# Patient Record
Sex: FEMALE | Race: OTHER | HISPANIC OR LATINO
[De-identification: names, ages, dates, MRNs, and addresses within clinical notes are randomized per-mention and may not be internally consistent; named-entity substitution may affect disease eponyms.]

---

## 2017-11-15 ENCOUNTER — TRANSCRIPTION ENCOUNTER (OUTPATIENT)
Age: 32
End: 2017-11-15

## 2020-08-05 ENCOUNTER — APPOINTMENT (OUTPATIENT)
Dept: ANTEPARTUM | Facility: CLINIC | Age: 35
End: 2020-08-05

## 2020-08-05 ENCOUNTER — ASOB RESULT (OUTPATIENT)
Age: 35
End: 2020-08-05

## 2020-08-05 ENCOUNTER — INPATIENT (INPATIENT)
Facility: HOSPITAL | Age: 35
LOS: 1 days | Discharge: HOME | End: 2020-08-07
Attending: OBSTETRICS & GYNECOLOGY | Admitting: OBSTETRICS & GYNECOLOGY
Payer: COMMERCIAL

## 2020-08-05 VITALS — WEIGHT: 158.73 LBS | HEIGHT: 62 IN

## 2020-08-05 DIAGNOSIS — Z3A.00 WEEKS OF GESTATION OF PREGNANCY NOT SPECIFIED: ICD-10-CM

## 2020-08-05 DIAGNOSIS — Z34.80 ENCOUNTER FOR SUPERVISION OF OTHER NORMAL PREGNANCY, UNSPECIFIED TRIMESTER: ICD-10-CM

## 2020-08-05 DIAGNOSIS — O26.899 OTHER SPECIFIED PREGNANCY RELATED CONDITIONS, UNSPECIFIED TRIMESTER: ICD-10-CM

## 2020-08-05 LAB
BASOPHILS # BLD AUTO: 0 K/UL — SIGNIFICANT CHANGE UP (ref 0–0.2)
BASOPHILS NFR BLD AUTO: 0 % — SIGNIFICANT CHANGE UP (ref 0–2)
BLD GP AB SCN SERPL QL: NEGATIVE — SIGNIFICANT CHANGE UP
EOSINOPHIL # BLD AUTO: 0 K/UL — SIGNIFICANT CHANGE UP (ref 0–0.5)
EOSINOPHIL NFR BLD AUTO: 0 % — SIGNIFICANT CHANGE UP (ref 0–6)
HCT VFR BLD CALC: 39.3 % — SIGNIFICANT CHANGE UP (ref 34.5–45)
HGB BLD-MCNC: 12.5 G/DL — SIGNIFICANT CHANGE UP (ref 11.5–15.5)
HYPOCHROMIA BLD QL: SLIGHT — SIGNIFICANT CHANGE UP
LYMPHOCYTES # BLD AUTO: 1.01 K/UL — SIGNIFICANT CHANGE UP (ref 1–3.3)
LYMPHOCYTES # BLD AUTO: 4.3 % — LOW (ref 13–44)
MANUAL SMEAR VERIFICATION: SIGNIFICANT CHANGE UP
MCHC RBC-ENTMCNC: 28.2 PG — SIGNIFICANT CHANGE UP (ref 27–34)
MCHC RBC-ENTMCNC: 31.8 GM/DL — LOW (ref 32–36)
MCV RBC AUTO: 88.7 FL — SIGNIFICANT CHANGE UP (ref 80–100)
MONOCYTES # BLD AUTO: 0.21 K/UL — SIGNIFICANT CHANGE UP (ref 0–0.9)
MONOCYTES NFR BLD AUTO: 0.9 % — LOW (ref 2–14)
NEUTROPHILS # BLD AUTO: 22.33 K/UL — HIGH (ref 1.8–7.4)
NEUTROPHILS NFR BLD AUTO: 94.8 % — HIGH (ref 43–77)
PLAT MORPH BLD: NORMAL — SIGNIFICANT CHANGE UP
PLATELET # BLD AUTO: 296 K/UL — SIGNIFICANT CHANGE UP (ref 150–400)
RBC # BLD: 4.43 M/UL — SIGNIFICANT CHANGE UP (ref 3.8–5.2)
RBC # FLD: 13.5 % — SIGNIFICANT CHANGE UP (ref 10.3–14.5)
RBC BLD AUTO: NORMAL — SIGNIFICANT CHANGE UP
RH IG SCN BLD-IMP: POSITIVE — SIGNIFICANT CHANGE UP
RH IG SCN BLD-IMP: POSITIVE — SIGNIFICANT CHANGE UP
SARS-COV-2 RNA SPEC QL NAA+PROBE: SIGNIFICANT CHANGE UP
T PALLIDUM AB TITR SER: NEGATIVE — SIGNIFICANT CHANGE UP
WBC # BLD: 23.56 K/UL — HIGH (ref 3.8–10.5)
WBC # FLD AUTO: 23.56 K/UL — HIGH (ref 3.8–10.5)

## 2020-08-05 PROCEDURE — 88307 TISSUE EXAM BY PATHOLOGIST: CPT | Mod: 26

## 2020-08-05 PROCEDURE — 76818 FETAL BIOPHYS PROFILE W/NST: CPT | Mod: 26

## 2020-08-05 RX ORDER — DIPHENHYDRAMINE HCL 50 MG
25 CAPSULE ORAL EVERY 6 HOURS
Refills: 0 | Status: DISCONTINUED | OUTPATIENT
Start: 2020-08-05 | End: 2020-08-07

## 2020-08-05 RX ORDER — SODIUM CHLORIDE 9 MG/ML
1000 INJECTION, SOLUTION INTRAVENOUS
Refills: 0 | Status: DISCONTINUED | OUTPATIENT
Start: 2020-08-05 | End: 2020-08-05

## 2020-08-05 RX ORDER — KETOROLAC TROMETHAMINE 30 MG/ML
30 SYRINGE (ML) INJECTION ONCE
Refills: 0 | Status: DISCONTINUED | OUTPATIENT
Start: 2020-08-05 | End: 2020-08-05

## 2020-08-05 RX ORDER — DIBUCAINE 1 %
1 OINTMENT (GRAM) RECTAL EVERY 6 HOURS
Refills: 0 | Status: DISCONTINUED | OUTPATIENT
Start: 2020-08-05 | End: 2020-08-07

## 2020-08-05 RX ORDER — AMPICILLIN TRIHYDRATE 250 MG
2 CAPSULE ORAL ONCE
Refills: 0 | Status: COMPLETED | OUTPATIENT
Start: 2020-08-05 | End: 2020-08-05

## 2020-08-05 RX ORDER — AMPICILLIN TRIHYDRATE 250 MG
1 CAPSULE ORAL EVERY 4 HOURS
Refills: 0 | Status: DISCONTINUED | OUTPATIENT
Start: 2020-08-05 | End: 2020-08-05

## 2020-08-05 RX ORDER — SODIUM CHLORIDE 9 MG/ML
3 INJECTION INTRAMUSCULAR; INTRAVENOUS; SUBCUTANEOUS EVERY 8 HOURS
Refills: 0 | Status: DISCONTINUED | OUTPATIENT
Start: 2020-08-05 | End: 2020-08-07

## 2020-08-05 RX ORDER — OXYTOCIN 10 UNIT/ML
333.33 VIAL (ML) INJECTION
Qty: 20 | Refills: 0 | Status: DISCONTINUED | OUTPATIENT
Start: 2020-08-05 | End: 2020-08-07

## 2020-08-05 RX ORDER — IBUPROFEN 200 MG
600 TABLET ORAL EVERY 6 HOURS
Refills: 0 | Status: COMPLETED | OUTPATIENT
Start: 2020-08-05 | End: 2021-07-04

## 2020-08-05 RX ORDER — OXYTOCIN 10 UNIT/ML
2 VIAL (ML) INJECTION
Qty: 30 | Refills: 0 | Status: DISCONTINUED | OUTPATIENT
Start: 2020-08-05 | End: 2020-08-05

## 2020-08-05 RX ORDER — OXYCODONE HYDROCHLORIDE 5 MG/1
5 TABLET ORAL ONCE
Refills: 0 | Status: DISCONTINUED | OUTPATIENT
Start: 2020-08-05 | End: 2020-08-07

## 2020-08-05 RX ORDER — SIMETHICONE 80 MG/1
80 TABLET, CHEWABLE ORAL EVERY 4 HOURS
Refills: 0 | Status: DISCONTINUED | OUTPATIENT
Start: 2020-08-05 | End: 2020-08-07

## 2020-08-05 RX ORDER — AER TRAVELER 0.5 G/1
1 SOLUTION RECTAL; TOPICAL EVERY 4 HOURS
Refills: 0 | Status: DISCONTINUED | OUTPATIENT
Start: 2020-08-05 | End: 2020-08-07

## 2020-08-05 RX ORDER — HYDROCORTISONE 1 %
1 OINTMENT (GRAM) TOPICAL EVERY 6 HOURS
Refills: 0 | Status: DISCONTINUED | OUTPATIENT
Start: 2020-08-05 | End: 2020-08-07

## 2020-08-05 RX ORDER — BENZOCAINE 10 %
1 GEL (GRAM) MUCOUS MEMBRANE EVERY 6 HOURS
Refills: 0 | Status: DISCONTINUED | OUTPATIENT
Start: 2020-08-05 | End: 2020-08-07

## 2020-08-05 RX ORDER — CITRIC ACID/SODIUM CITRATE 300-500 MG
15 SOLUTION, ORAL ORAL EVERY 6 HOURS
Refills: 0 | Status: DISCONTINUED | OUTPATIENT
Start: 2020-08-05 | End: 2020-08-05

## 2020-08-05 RX ORDER — OXYCODONE HYDROCHLORIDE 5 MG/1
5 TABLET ORAL
Refills: 0 | Status: DISCONTINUED | OUTPATIENT
Start: 2020-08-05 | End: 2020-08-07

## 2020-08-05 RX ORDER — PRAMOXINE HYDROCHLORIDE 150 MG/15G
1 AEROSOL, FOAM RECTAL EVERY 4 HOURS
Refills: 0 | Status: DISCONTINUED | OUTPATIENT
Start: 2020-08-05 | End: 2020-08-07

## 2020-08-05 RX ORDER — ACETAMINOPHEN 500 MG
975 TABLET ORAL
Refills: 0 | Status: DISCONTINUED | OUTPATIENT
Start: 2020-08-05 | End: 2020-08-07

## 2020-08-05 RX ORDER — MAGNESIUM HYDROXIDE 400 MG/1
30 TABLET, CHEWABLE ORAL
Refills: 0 | Status: DISCONTINUED | OUTPATIENT
Start: 2020-08-05 | End: 2020-08-07

## 2020-08-05 RX ORDER — LANOLIN
1 OINTMENT (GRAM) TOPICAL EVERY 6 HOURS
Refills: 0 | Status: DISCONTINUED | OUTPATIENT
Start: 2020-08-05 | End: 2020-08-07

## 2020-08-05 RX ORDER — TETANUS TOXOID, REDUCED DIPHTHERIA TOXOID AND ACELLULAR PERTUSSIS VACCINE, ADSORBED 5; 2.5; 8; 8; 2.5 [IU]/.5ML; [IU]/.5ML; UG/.5ML; UG/.5ML; UG/.5ML
0.5 SUSPENSION INTRAMUSCULAR ONCE
Refills: 0 | Status: DISCONTINUED | OUTPATIENT
Start: 2020-08-05 | End: 2020-08-07

## 2020-08-05 RX ORDER — SODIUM CHLORIDE 9 MG/ML
1000 INJECTION INTRAMUSCULAR; INTRAVENOUS; SUBCUTANEOUS ONCE
Refills: 0 | Status: COMPLETED | OUTPATIENT
Start: 2020-08-05 | End: 2020-08-05

## 2020-08-05 RX ORDER — OXYTOCIN 10 UNIT/ML
333.33 VIAL (ML) INJECTION
Qty: 20 | Refills: 0 | Status: COMPLETED | OUTPATIENT
Start: 2020-08-05 | End: 2020-08-05

## 2020-08-05 RX ADMIN — Medication 1000 MILLIUNIT(S)/MIN: at 20:06

## 2020-08-05 RX ADMIN — SODIUM CHLORIDE 125 MILLILITER(S): 9 INJECTION, SOLUTION INTRAVENOUS at 17:20

## 2020-08-05 RX ADMIN — Medication 108 GRAM(S): at 17:22

## 2020-08-05 RX ADMIN — Medication 30 MILLIGRAM(S): at 21:33

## 2020-08-05 RX ADMIN — Medication 0.2 MILLIGRAM(S): at 20:13

## 2020-08-05 RX ADMIN — SODIUM CHLORIDE 1000 MILLILITER(S): 9 INJECTION INTRAMUSCULAR; INTRAVENOUS; SUBCUTANEOUS at 19:30

## 2020-08-05 RX ADMIN — Medication 2 MILLIUNIT(S)/MIN: at 16:05

## 2020-08-05 RX ADMIN — Medication 216 GRAM(S): at 13:38

## 2020-08-06 ENCOUNTER — TRANSCRIPTION ENCOUNTER (OUTPATIENT)
Age: 35
End: 2020-08-06

## 2020-08-06 LAB
SARS-COV-2 IGG SERPL QL IA: POSITIVE
SARS-COV-2 IGM SERPL IA-ACNC: 51.2 INDEX — HIGH

## 2020-08-06 RX ORDER — TETANUS TOXOID, REDUCED DIPHTHERIA TOXOID AND ACELLULAR PERTUSSIS VACCINE, ADSORBED 5; 2.5; 8; 8; 2.5 [IU]/.5ML; [IU]/.5ML; UG/.5ML; UG/.5ML; UG/.5ML
0.5 SUSPENSION INTRAMUSCULAR ONCE
Refills: 0 | Status: DISCONTINUED | OUTPATIENT
Start: 2020-08-06 | End: 2020-08-06

## 2020-08-06 RX ORDER — ACETAMINOPHEN 500 MG
975 TABLET ORAL
Refills: 0 | Status: DISCONTINUED | OUTPATIENT
Start: 2020-08-06 | End: 2020-08-06

## 2020-08-06 RX ORDER — LANOLIN
1 OINTMENT (GRAM) TOPICAL
Qty: 0 | Refills: 0 | DISCHARGE
Start: 2020-08-06

## 2020-08-06 RX ORDER — ACETAMINOPHEN 500 MG
3 TABLET ORAL
Qty: 0 | Refills: 0 | DISCHARGE
Start: 2020-08-06

## 2020-08-06 RX ORDER — IBUPROFEN 200 MG
600 TABLET ORAL EVERY 6 HOURS
Refills: 0 | Status: DISCONTINUED | OUTPATIENT
Start: 2020-08-06 | End: 2020-08-06

## 2020-08-06 RX ORDER — MAGNESIUM HYDROXIDE 400 MG/1
30 TABLET, CHEWABLE ORAL
Refills: 0 | Status: DISCONTINUED | OUTPATIENT
Start: 2020-08-06 | End: 2020-08-06

## 2020-08-06 RX ORDER — IBUPROFEN 200 MG
600 TABLET ORAL EVERY 6 HOURS
Refills: 0 | Status: DISCONTINUED | OUTPATIENT
Start: 2020-08-06 | End: 2020-08-07

## 2020-08-06 RX ORDER — OXYCODONE HYDROCHLORIDE 5 MG/1
5 TABLET ORAL ONCE
Refills: 0 | Status: DISCONTINUED | OUTPATIENT
Start: 2020-08-06 | End: 2020-08-06

## 2020-08-06 RX ORDER — OXYCODONE HYDROCHLORIDE 5 MG/1
5 TABLET ORAL
Refills: 0 | Status: DISCONTINUED | OUTPATIENT
Start: 2020-08-06 | End: 2020-08-06

## 2020-08-06 RX ORDER — IBUPROFEN 200 MG
1 TABLET ORAL
Qty: 0 | Refills: 0 | DISCHARGE
Start: 2020-08-06

## 2020-08-06 RX ORDER — OXYTOCIN 10 UNIT/ML
333.33 VIAL (ML) INJECTION
Qty: 20 | Refills: 0 | Status: DISCONTINUED | OUTPATIENT
Start: 2020-08-06 | End: 2020-08-06

## 2020-08-06 RX ORDER — DIPHENHYDRAMINE HCL 50 MG
25 CAPSULE ORAL EVERY 6 HOURS
Refills: 0 | Status: DISCONTINUED | OUTPATIENT
Start: 2020-08-06 | End: 2020-08-06

## 2020-08-06 RX ORDER — SIMETHICONE 80 MG/1
80 TABLET, CHEWABLE ORAL EVERY 4 HOURS
Refills: 0 | Status: DISCONTINUED | OUTPATIENT
Start: 2020-08-06 | End: 2020-08-06

## 2020-08-06 RX ORDER — LANOLIN
1 OINTMENT (GRAM) TOPICAL EVERY 6 HOURS
Refills: 0 | Status: DISCONTINUED | OUTPATIENT
Start: 2020-08-06 | End: 2020-08-06

## 2020-08-06 RX ORDER — KETOROLAC TROMETHAMINE 30 MG/ML
30 SYRINGE (ML) INJECTION EVERY 6 HOURS
Refills: 0 | Status: DISCONTINUED | OUTPATIENT
Start: 2020-08-06 | End: 2020-08-06

## 2020-08-06 RX ORDER — SODIUM CHLORIDE 9 MG/ML
1000 INJECTION, SOLUTION INTRAVENOUS
Refills: 0 | Status: DISCONTINUED | OUTPATIENT
Start: 2020-08-06 | End: 2020-08-06

## 2020-08-06 RX ADMIN — Medication 0.2 MILLIGRAM(S): at 17:27

## 2020-08-06 RX ADMIN — SIMETHICONE 80 MILLIGRAM(S): 80 TABLET, CHEWABLE ORAL at 17:28

## 2020-08-06 RX ADMIN — Medication 0.2 MILLIGRAM(S): at 21:10

## 2020-08-06 RX ADMIN — Medication 0.2 MILLIGRAM(S): at 10:09

## 2020-08-06 RX ADMIN — Medication 1 TABLET(S): at 17:29

## 2020-08-06 RX ADMIN — SODIUM CHLORIDE 3 MILLILITER(S): 9 INJECTION INTRAMUSCULAR; INTRAVENOUS; SUBCUTANEOUS at 06:13

## 2020-08-06 RX ADMIN — Medication 0.2 MILLIGRAM(S): at 06:12

## 2020-08-06 NOTE — PROGRESS NOTE ADULT - SUBJECTIVE AND OBJECTIVE BOX
she is resting.  breast feeding her baby.  voiding and passing gas regular diet.  mild vaginal bleeding

## 2020-08-06 NOTE — PROGRESS NOTE ADULT - SUBJECTIVE AND OBJECTIVE BOX
Postpartum Note- PPD#1    Allergies    No Known Allergies    Blood Type O   Positive    RPR : Negative    Rubella: Immune    S:Patient is a  35y    P 1001     PPD#1         S/P VAVD  Patient w/o complaints, pain is controlled.    Pt is OOB, tolerating PO, passing flatus. Lochia WNL.     Feeding: Breast    O:  Vital Signs Last 24 Hrs  T(C): 36.7 (06 Aug 2020 05:09), Max: 37.8 (05 Aug 2020 20:45)  T(F): 98.1 (06 Aug 2020 05:09), Max: 100 (05 Aug 2020 20:45)  HR: 85 (06 Aug 2020 05:09) (80 - 93)  BP: 96/62 (06 Aug 2020 05:09) (96/62 - 122/56)  BP(mean): 73 (05 Aug 2020 22:15) (73 - 87)  RR: 18 (06 Aug 2020 05:09) (16 - 18)  SpO2: 99% (06 Aug 2020 05:09) (96% - 100%)     Gen: NAD  Abdomen: Soft, nontender, non-distended, fundus firm.  Vaginal: Lochia WNL  Ext:  Neg calf tenderness    LABS:    Hemoglobin: 12.5 g/dL (08-05 @ 13:33)      Hematocrit: 39.3 % (08-05 @ 13:33)

## 2020-08-06 NOTE — PROGRESS NOTE ADULT - ASSESSMENT
Labs:                        12.5   23.56 )-----------( 296      ( 05 Aug 2020 13:33 )             39.3       A: 35y PPD# s/p  doing well.    Plan:  Routine postpartum care  Encouraged out of bed  Regular diet

## 2020-08-06 NOTE — DISCHARGE NOTE OB - PATIENT PORTAL LINK FT
You can access the FollowMyHealth Patient Portal offered by [facility] by registering at the following website: [email or web address]. By joining [employer]’s FollowMyHealth portal, you will also be able to view your health information using other applications (apps) compatible with our system.

## 2020-08-06 NOTE — DISCHARGE NOTE OB - CARE PROVIDER_API CALL
Perlita Gutierrez J  OBSTETRICS AND GYNECOLOGY  [address]  [place] [address]  Phone: [phone]  Fax: [phone]  Follow Up Time:

## 2020-08-06 NOTE — PROGRESS NOTE ADULT - ASSESSMENT
A/P:  35y  PPD # 1      S/P VAVD, c/b shoulder dystocia      doing well    PMHx: none  Current Issues: none

## 2020-08-06 NOTE — DISCHARGE NOTE OB - CARE PLAN
Principal Discharge DX:	Vaginal delivery  Goal:	safe delivery of baby  Assessment and plan of treatment:	vaginal delivery

## 2020-08-06 NOTE — DISCHARGE NOTE OB - HOSPITAL COURSE
richard progressed to fully dilation at time she had varible deceleration when fully she was having bradycardia due to head wedged in.  vaccum was used to delver the fetus.  she had second degree preneal tear

## 2020-08-06 NOTE — DISCHARGE NOTE OB - MEDICATION SUMMARY - MEDICATIONS TO TAKE
I will START or STAY ON the medications listed below when I get home from the hospital:    Actamin 325 mg oral tablet  -- 3 tab(s) by mouth   -- Indication: For for mild to moderate pain    ibuprofen 600 mg oral tablet  -- 1 tab(s) by mouth every 6 hours  -- Indication: For for moderate to sever pain    lanolin topical ointment  -- 1 application on skin every 6 hours, As needed, nipple soreness  -- Indication: For for breast feeding    Prenatal Multivitamins oral tablet  -- Indication: For for breast feeding

## 2020-08-06 NOTE — PROGRESS NOTE ADULT - SUBJECTIVE AND OBJECTIVE BOX
S: Patient doing well. No complaints. Minimal lochia. Pain controlled. she is trying to breast feed    O: Vital Signs Last 24 Hrs  T(C): 36.9 (06 Aug 2020 09:15), Max: 37.8 (05 Aug 2020 20:45)  T(F): 98.4 (06 Aug 2020 09:15), Max: 100 (05 Aug 2020 20:45)  HR: 95 (06 Aug 2020 09:15) (80 - 95)  BP: 94/60 (06 Aug 2020 09:15) (94/60 - 122/56)  BP(mean): 73 (05 Aug 2020 22:15) (73 - 87)  RR: 17 (06 Aug 2020 09:15) (16 - 18)  SpO2: 98% (06 Aug 2020 09:15) (96% - 100%)    Gen: NAD  Abd: soft, Nontender, Nondistended, fundus firm  Ext: no tendern, mild edema

## 2020-08-06 NOTE — DISCHARGE NOTE OB - MATERIALS PROVIDED
[facility] Hearing Screen Program/Back To Sleep Handout/Shaken Baby Prevention Handout/Breastfeeding Guide and Packet/Vaccinations/[facility]  Screening Program/Breastfeeding Log/Breastfeeding Mother’s Support Group Information/Guide to Postpartum Care/Birth Certificate Instructions

## 2020-08-07 VITALS
HEART RATE: 71 BPM | RESPIRATION RATE: 18 BRPM | SYSTOLIC BLOOD PRESSURE: 102 MMHG | DIASTOLIC BLOOD PRESSURE: 68 MMHG | OXYGEN SATURATION: 98 % | TEMPERATURE: 99 F

## 2020-08-07 PROCEDURE — 86900 BLOOD TYPING SEROLOGIC ABO: CPT

## 2020-08-07 PROCEDURE — 86780 TREPONEMA PALLIDUM: CPT

## 2020-08-07 PROCEDURE — 86901 BLOOD TYPING SEROLOGIC RH(D): CPT

## 2020-08-07 PROCEDURE — 88307 TISSUE EXAM BY PATHOLOGIST: CPT

## 2020-08-07 PROCEDURE — C1889: CPT

## 2020-08-07 PROCEDURE — 76818 FETAL BIOPHYS PROFILE W/NST: CPT

## 2020-08-07 PROCEDURE — 86769 SARS-COV-2 COVID-19 ANTIBODY: CPT

## 2020-08-07 PROCEDURE — 87635 SARS-COV-2 COVID-19 AMP PRB: CPT

## 2020-08-07 PROCEDURE — 85027 COMPLETE CBC AUTOMATED: CPT

## 2020-08-07 PROCEDURE — 59050 FETAL MONITOR W/REPORT: CPT

## 2020-08-07 PROCEDURE — G0463: CPT

## 2020-08-07 PROCEDURE — 59025 FETAL NON-STRESS TEST: CPT

## 2020-08-07 PROCEDURE — 86850 RBC ANTIBODY SCREEN: CPT

## 2020-08-07 RX ADMIN — Medication 0.2 MILLIGRAM(S): at 01:02

## 2020-08-11 LAB — SURGICAL PATHOLOGY STUDY: SIGNIFICANT CHANGE UP

## 2021-12-02 NOTE — PATIENT PROFILE OB - AS LABOR RUPTURE OF MEMBRANES YN
[FreeTextEntry1] : Awake, alert, and oriented x 3. VSS. In no apparent distress.   Short and long term memory intact.  Speech is clear and appropriate.  Affect is normal.  Voice is strong.  Respirations easy and even.  Normal skin color and pigmentation.  The sclera and conjunctiva normal.  Ears, nose, and neck normal in appearance.  EOMI, no nystagmus, facial sensation intact symmetrically, face symmetrical, hearing intact bilaterally, tongue and palate midline, head turning and shoulder shrug symmetric and no tongue deviation with protrusion.  No pronator drift.   No past-pointing, no tremors noted, no dysdiadochokinesia, and finger to nose dysmetria was not present.  Romberg negative.  \par Right  hand dominant.\par  Incision healing well. NO signs of infection. \par Rises from a seated position in a comfortable fashion.\par \par Gait is well coordinated and stable without the use of an assistive device.   Able to perform tandem walk without loss of balance.   Motor strength in the upper extremities 5/5 in the biceps, triceps, and hand .  Motor strength in the lower extremities is 5/5 in the iliopsoas, quadriceps, and hamstrings.  \par \par 
yes

## 2022-08-01 ENCOUNTER — APPOINTMENT (OUTPATIENT)
Dept: ANTEPARTUM | Facility: CLINIC | Age: 37
End: 2022-08-01

## 2022-08-01 ENCOUNTER — ASOB RESULT (OUTPATIENT)
Age: 37
End: 2022-08-01

## 2022-08-01 PROCEDURE — 76811 OB US DETAILED SNGL FETUS: CPT

## 2022-10-14 ENCOUNTER — NON-APPOINTMENT (OUTPATIENT)
Age: 37
End: 2022-10-14

## 2022-10-17 ENCOUNTER — APPOINTMENT (OUTPATIENT)
Dept: MATERNAL FETAL MEDICINE | Facility: CLINIC | Age: 37
End: 2022-10-17

## 2022-10-17 ENCOUNTER — ASOB RESULT (OUTPATIENT)
Age: 37
End: 2022-10-17

## 2022-10-17 DIAGNOSIS — O24.419 GESTATIONAL DIABETES MELLITUS IN PREGNANCY, UNSPECIFIED CONTROL: ICD-10-CM

## 2022-10-17 PROCEDURE — G0109 DIAB MANAGE TRN IND/GROUP: CPT | Mod: 95

## 2022-10-17 RX ORDER — URINE ACETONE TEST STRIPS
STRIP MISCELLANEOUS
Qty: 1 | Refills: 2 | Status: ACTIVE | COMMUNITY
Start: 2022-10-17 | End: 1900-01-01

## 2022-10-24 ENCOUNTER — APPOINTMENT (OUTPATIENT)
Dept: MATERNAL FETAL MEDICINE | Facility: CLINIC | Age: 37
End: 2022-10-24

## 2022-10-24 ENCOUNTER — ASOB RESULT (OUTPATIENT)
Age: 37
End: 2022-10-24

## 2022-10-24 PROCEDURE — G0108 DIAB MANAGE TRN  PER INDIV: CPT | Mod: 95

## 2022-11-07 ENCOUNTER — ASOB RESULT (OUTPATIENT)
Age: 37
End: 2022-11-07

## 2022-11-07 ENCOUNTER — APPOINTMENT (OUTPATIENT)
Dept: MATERNAL FETAL MEDICINE | Facility: CLINIC | Age: 37
End: 2022-11-07

## 2022-11-07 PROCEDURE — G0108 DIAB MANAGE TRN  PER INDIV: CPT | Mod: 95

## 2022-11-29 ENCOUNTER — INPATIENT (INPATIENT)
Facility: HOSPITAL | Age: 37
LOS: 0 days | Discharge: HOME | End: 2022-11-30
Attending: OBSTETRICS & GYNECOLOGY | Admitting: OBSTETRICS & GYNECOLOGY
Payer: COMMERCIAL

## 2022-11-29 ENCOUNTER — TRANSCRIPTION ENCOUNTER (OUTPATIENT)
Age: 37
End: 2022-11-29

## 2022-11-29 VITALS
HEART RATE: 89 BPM | DIASTOLIC BLOOD PRESSURE: 77 MMHG | SYSTOLIC BLOOD PRESSURE: 122 MMHG | TEMPERATURE: 99 F | RESPIRATION RATE: 18 BRPM

## 2022-11-29 DIAGNOSIS — Z34.80 ENCOUNTER FOR SUPERVISION OF OTHER NORMAL PREGNANCY, UNSPECIFIED TRIMESTER: ICD-10-CM

## 2022-11-29 DIAGNOSIS — Z3A.00 WEEKS OF GESTATION OF PREGNANCY NOT SPECIFIED: ICD-10-CM

## 2022-11-29 DIAGNOSIS — O26.899 OTHER SPECIFIED PREGNANCY RELATED CONDITIONS, UNSPECIFIED TRIMESTER: ICD-10-CM

## 2022-11-29 LAB
BASOPHILS # BLD AUTO: 0.03 K/UL — SIGNIFICANT CHANGE UP (ref 0–0.2)
BASOPHILS NFR BLD AUTO: 0.3 % — SIGNIFICANT CHANGE UP (ref 0–2)
BLD GP AB SCN SERPL QL: NEGATIVE — SIGNIFICANT CHANGE UP
COVID-19 SPIKE DOMAIN AB INTERP: POSITIVE
COVID-19 SPIKE DOMAIN ANTIBODY RESULT: >250 U/ML — HIGH
EOSINOPHIL # BLD AUTO: 0.01 K/UL — SIGNIFICANT CHANGE UP (ref 0–0.5)
EOSINOPHIL NFR BLD AUTO: 0.1 % — SIGNIFICANT CHANGE UP (ref 0–6)
GLUCOSE BLDC GLUCOMTR-MCNC: 116 MG/DL — HIGH (ref 70–99)
HCT VFR BLD CALC: 35.4 % — SIGNIFICANT CHANGE UP (ref 34.5–45)
HGB BLD-MCNC: 11.1 G/DL — LOW (ref 11.5–15.5)
IMM GRANULOCYTES NFR BLD AUTO: 0.5 % — SIGNIFICANT CHANGE UP (ref 0–0.9)
LYMPHOCYTES # BLD AUTO: 1.61 K/UL — SIGNIFICANT CHANGE UP (ref 1–3.3)
LYMPHOCYTES # BLD AUTO: 13.9 % — SIGNIFICANT CHANGE UP (ref 13–44)
MCHC RBC-ENTMCNC: 27.1 PG — SIGNIFICANT CHANGE UP (ref 27–34)
MCHC RBC-ENTMCNC: 31.4 GM/DL — LOW (ref 32–36)
MCV RBC AUTO: 86.6 FL — SIGNIFICANT CHANGE UP (ref 80–100)
MONOCYTES # BLD AUTO: 0.54 K/UL — SIGNIFICANT CHANGE UP (ref 0–0.9)
MONOCYTES NFR BLD AUTO: 4.7 % — SIGNIFICANT CHANGE UP (ref 2–14)
NEUTROPHILS # BLD AUTO: 9.31 K/UL — HIGH (ref 1.8–7.4)
NEUTROPHILS NFR BLD AUTO: 80.5 % — HIGH (ref 43–77)
NRBC # BLD: 0 /100 WBCS — SIGNIFICANT CHANGE UP (ref 0–0)
PLATELET # BLD AUTO: 275 K/UL — SIGNIFICANT CHANGE UP (ref 150–400)
RBC # BLD: 4.09 M/UL — SIGNIFICANT CHANGE UP (ref 3.8–5.2)
RBC # FLD: 14.8 % — HIGH (ref 10.3–14.5)
RH IG SCN BLD-IMP: POSITIVE — SIGNIFICANT CHANGE UP
SARS-COV-2 IGG+IGM SERPL QL IA: >250 U/ML — HIGH
SARS-COV-2 IGG+IGM SERPL QL IA: POSITIVE
SARS-COV-2 RNA SPEC QL NAA+PROBE: SIGNIFICANT CHANGE UP
T PALLIDUM AB TITR SER: NEGATIVE — SIGNIFICANT CHANGE UP
WBC # BLD: 11.56 K/UL — HIGH (ref 3.8–10.5)
WBC # FLD AUTO: 11.56 K/UL — HIGH (ref 3.8–10.5)

## 2022-11-29 PROCEDURE — 85025 COMPLETE CBC W/AUTO DIFF WBC: CPT

## 2022-11-29 PROCEDURE — 82962 GLUCOSE BLOOD TEST: CPT

## 2022-11-29 PROCEDURE — G0463: CPT

## 2022-11-29 PROCEDURE — 86850 RBC ANTIBODY SCREEN: CPT

## 2022-11-29 PROCEDURE — 87635 SARS-COV-2 COVID-19 AMP PRB: CPT

## 2022-11-29 PROCEDURE — 86780 TREPONEMA PALLIDUM: CPT

## 2022-11-29 PROCEDURE — 59025 FETAL NON-STRESS TEST: CPT

## 2022-11-29 PROCEDURE — 86769 SARS-COV-2 COVID-19 ANTIBODY: CPT

## 2022-11-29 PROCEDURE — 86900 BLOOD TYPING SEROLOGIC ABO: CPT

## 2022-11-29 PROCEDURE — 59050 FETAL MONITOR W/REPORT: CPT

## 2022-11-29 PROCEDURE — 86901 BLOOD TYPING SEROLOGIC RH(D): CPT

## 2022-11-29 RX ORDER — AMPICILLIN TRIHYDRATE 250 MG
1 CAPSULE ORAL EVERY 4 HOURS
Refills: 0 | Status: DISCONTINUED | OUTPATIENT
Start: 2022-11-29 | End: 2022-11-29

## 2022-11-29 RX ORDER — CITRIC ACID/SODIUM CITRATE 300-500 MG
15 SOLUTION, ORAL ORAL EVERY 6 HOURS
Refills: 0 | Status: DISCONTINUED | OUTPATIENT
Start: 2022-11-29 | End: 2022-11-29

## 2022-11-29 RX ORDER — TETANUS TOXOID, REDUCED DIPHTHERIA TOXOID AND ACELLULAR PERTUSSIS VACCINE, ADSORBED 5; 2.5; 8; 8; 2.5 [IU]/.5ML; [IU]/.5ML; UG/.5ML; UG/.5ML; UG/.5ML
0.5 SUSPENSION INTRAMUSCULAR ONCE
Refills: 0 | Status: DISCONTINUED | OUTPATIENT
Start: 2022-11-29 | End: 2022-11-30

## 2022-11-29 RX ORDER — AMPICILLIN TRIHYDRATE 250 MG
2 CAPSULE ORAL ONCE
Refills: 0 | Status: COMPLETED | OUTPATIENT
Start: 2022-11-29 | End: 2022-11-29

## 2022-11-29 RX ORDER — HYDROCORTISONE 1 %
1 OINTMENT (GRAM) TOPICAL EVERY 6 HOURS
Refills: 0 | Status: DISCONTINUED | OUTPATIENT
Start: 2022-11-29 | End: 2022-11-30

## 2022-11-29 RX ORDER — AER TRAVELER 0.5 G/1
1 SOLUTION RECTAL; TOPICAL EVERY 4 HOURS
Refills: 0 | Status: DISCONTINUED | OUTPATIENT
Start: 2022-11-29 | End: 2022-11-30

## 2022-11-29 RX ORDER — BENZOCAINE 10 %
1 GEL (GRAM) MUCOUS MEMBRANE EVERY 6 HOURS
Refills: 0 | Status: DISCONTINUED | OUTPATIENT
Start: 2022-11-29 | End: 2022-11-30

## 2022-11-29 RX ORDER — PRAMOXINE HYDROCHLORIDE 150 MG/15G
1 AEROSOL, FOAM RECTAL EVERY 4 HOURS
Refills: 0 | Status: DISCONTINUED | OUTPATIENT
Start: 2022-11-29 | End: 2022-11-30

## 2022-11-29 RX ORDER — MAGNESIUM HYDROXIDE 400 MG/1
30 TABLET, CHEWABLE ORAL
Refills: 0 | Status: DISCONTINUED | OUTPATIENT
Start: 2022-11-29 | End: 2022-11-30

## 2022-11-29 RX ORDER — ACETAMINOPHEN 500 MG
975 TABLET ORAL
Refills: 0 | Status: DISCONTINUED | OUTPATIENT
Start: 2022-11-29 | End: 2022-11-30

## 2022-11-29 RX ORDER — OXYTOCIN 10 UNIT/ML
333.33 VIAL (ML) INJECTION
Qty: 20 | Refills: 0 | Status: COMPLETED | OUTPATIENT
Start: 2022-11-29 | End: 2022-11-29

## 2022-11-29 RX ORDER — SODIUM CHLORIDE 9 MG/ML
3 INJECTION INTRAMUSCULAR; INTRAVENOUS; SUBCUTANEOUS EVERY 8 HOURS
Refills: 0 | Status: DISCONTINUED | OUTPATIENT
Start: 2022-11-29 | End: 2022-11-30

## 2022-11-29 RX ORDER — OXYTOCIN 10 UNIT/ML
2 VIAL (ML) INJECTION
Qty: 30 | Refills: 0 | Status: DISCONTINUED | OUTPATIENT
Start: 2022-11-29 | End: 2022-11-30

## 2022-11-29 RX ORDER — OXYCODONE HYDROCHLORIDE 5 MG/1
5 TABLET ORAL
Refills: 0 | Status: DISCONTINUED | OUTPATIENT
Start: 2022-11-29 | End: 2022-11-30

## 2022-11-29 RX ORDER — CHLORHEXIDINE GLUCONATE 213 G/1000ML
1 SOLUTION TOPICAL ONCE
Refills: 0 | Status: DISCONTINUED | OUTPATIENT
Start: 2022-11-29 | End: 2022-11-29

## 2022-11-29 RX ORDER — SIMETHICONE 80 MG/1
80 TABLET, CHEWABLE ORAL EVERY 4 HOURS
Refills: 0 | Status: DISCONTINUED | OUTPATIENT
Start: 2022-11-29 | End: 2022-11-30

## 2022-11-29 RX ORDER — OXYTOCIN 10 UNIT/ML
41.67 VIAL (ML) INJECTION
Qty: 20 | Refills: 0 | Status: DISCONTINUED | OUTPATIENT
Start: 2022-11-29 | End: 2022-11-30

## 2022-11-29 RX ORDER — KETOROLAC TROMETHAMINE 30 MG/ML
30 SYRINGE (ML) INJECTION ONCE
Refills: 0 | Status: DISCONTINUED | OUTPATIENT
Start: 2022-11-29 | End: 2022-11-30

## 2022-11-29 RX ORDER — SODIUM CHLORIDE 9 MG/ML
1000 INJECTION, SOLUTION INTRAVENOUS
Refills: 0 | Status: DISCONTINUED | OUTPATIENT
Start: 2022-11-29 | End: 2022-11-29

## 2022-11-29 RX ORDER — LANOLIN
1 OINTMENT (GRAM) TOPICAL EVERY 6 HOURS
Refills: 0 | Status: DISCONTINUED | OUTPATIENT
Start: 2022-11-29 | End: 2022-11-30

## 2022-11-29 RX ORDER — IBUPROFEN 200 MG
600 TABLET ORAL EVERY 6 HOURS
Refills: 0 | Status: COMPLETED | OUTPATIENT
Start: 2022-11-29 | End: 2023-10-28

## 2022-11-29 RX ORDER — DIBUCAINE 1 %
1 OINTMENT (GRAM) RECTAL EVERY 6 HOURS
Refills: 0 | Status: DISCONTINUED | OUTPATIENT
Start: 2022-11-29 | End: 2022-11-30

## 2022-11-29 RX ORDER — DIPHENHYDRAMINE HCL 50 MG
25 CAPSULE ORAL EVERY 6 HOURS
Refills: 0 | Status: DISCONTINUED | OUTPATIENT
Start: 2022-11-29 | End: 2022-11-30

## 2022-11-29 RX ORDER — OXYCODONE HYDROCHLORIDE 5 MG/1
5 TABLET ORAL ONCE
Refills: 0 | Status: DISCONTINUED | OUTPATIENT
Start: 2022-11-29 | End: 2022-11-30

## 2022-11-29 RX ADMIN — Medication 1000 MILLIUNIT(S)/MIN: at 16:47

## 2022-11-29 RX ADMIN — Medication 975 MILLIGRAM(S): at 20:39

## 2022-11-29 RX ADMIN — SODIUM CHLORIDE 125 MILLILITER(S): 9 INJECTION, SOLUTION INTRAVENOUS at 14:18

## 2022-11-29 RX ADMIN — Medication 975 MILLIGRAM(S): at 21:00

## 2022-11-29 RX ADMIN — Medication 200 GRAM(S): at 12:22

## 2022-11-29 RX ADMIN — SODIUM CHLORIDE 125 MILLILITER(S): 9 INJECTION, SOLUTION INTRAVENOUS at 12:41

## 2022-11-29 NOTE — DISCHARGE NOTE OB - CARE PLAN
1 Principal Discharge DX:	Term pregnancy delivered   Principal Discharge DX:	Term pregnancy delivered  Assessment and plan of treatment:	postpartum

## 2022-11-29 NOTE — DISCHARGE NOTE OB - ADDITIONAL INSTRUCTIONS
call office if heavy vaginal bleeding, shortness of breath, chest pain,headache call office if heavy vaginal bleeding, shortness of breath, chest pain, headache

## 2022-11-29 NOTE — DISCHARGE NOTE OB - MEDICATION SUMMARY - MEDICATIONS TO TAKE
I will START or STAY ON the medications listed below when I get home from the hospital:    ibuprofen 600 mg oral tablet  -- 1 tab(s) by mouth every 6 hours  -- Indication: For for moderate to sever pain    lanolin topical ointment  -- 1 application on skin every 6 hours, As needed, nipple soreness  -- Indication: For for breast feeding

## 2022-11-29 NOTE — OB PROVIDER H&P - NSHPPHYSICALEXAM_GEN_ALL_CORE
VS:  Vital Signs Last 24 Hrs  T(C): --  T(F): --  HR: 87 (29 Nov 2022 11:55) (87 - 89)  BP: 122/77 (29 Nov 2022 11:54) (122/77 - 122/77)  BP(mean): --  RR: --  SpO2: 99% (29 Nov 2022 11:55) (99% - 99%)      GA: NAD  Cards: RRR  Pulm: CTAB  EFH: cat 1  [place]: q 4mins  VE: 6.5/90/-2  TAUS: vertex

## 2022-11-29 NOTE — PRE-ANESTHESIA EVALUATION ADULT - NSANTHOSAYNRD_GEN_A_CORE
No. [place] screening performed.  STOP BANG Legend: 0-2 = LOW Risk; 3-4 = INTERMEDIATE Risk; 5-8 = HIGH Risk

## 2022-11-29 NOTE — OB PROVIDER LABOR PROGRESS NOTE - ASSESSMENT
patient has not made any progress within one hour, contractions are short, she OP.  she was on her right side for one hour now will place on her left.  first child was 6 14 pushed 15 minutes
arom done , she is 9 cm meconium moderate present, will notify pediatric.

## 2022-11-29 NOTE — OB PROVIDER DELIVERY SUMMARY - NSPROVIDERDELIVERYNOTE_OBGYN_ALL_OB_FT
patient was admitted at 6 cm had arom at 9 cm no progress for 2 hours Pitocin was started, baby was op, manual rotation was done with 3 push in mac tirso position she delivered without issue baby mouth was cleared , delayed cord clamp was done,   baby boy  spontaneous complete placenta, uterus was explored for retain membrane and was clean.  the tear was closed with one 2-0 chromic.

## 2022-11-29 NOTE — DISCHARGE NOTE OB - MATERIALS PROVIDED
[facility] Screening Program/Breastfeeding Log/Breastfeeding Mother’s Support Group Information/Guide to Postpartum Care/[facility] Hearing Screen Program/Back To Sleep Handout/Shaken Baby Prevention Handout/Breastfeeding Guide and Packet

## 2022-11-29 NOTE — OB RN PATIENT PROFILE - FUNCTIONAL ASSESSMENT - BASIC MOBILITY 2.
pt appears as if she had been recently crying, running eye makeup, pt now calm 4 = No assist / stand by assistance

## 2022-11-29 NOTE — OB RN PATIENT PROFILE - FALL HARM RISK - UNIVERSAL INTERVENTIONS
Bed in lowest position, wheels locked, appropriate side rails in place/Call bell, personal items and telephone in reach/Instruct patient to call for assistance before getting out of bed or chair/Non-slip footwear when patient is out of bed/[place] to call system/Physically safe environment - no spills, clutter or unnecessary equipment/Purposeful Proactive Rounding/Room/bathroom lighting operational, light cord in reach

## 2022-11-29 NOTE — OB PROVIDER IHI INDUCTION/AUGMENTATION NOTE - NS_FETALPRESENTATIONA_OBGYN_ALL_OB
CTS : needs [facility] (++++) for CTS ==> for surgery ==> ortho hand man 12-20-16 // still pending 3-21-17 (+ ) finally complete : now went ==> to ortho 12-12-17 // has seen Dr. castro at [facility]. : did injections and better 11-7-18  //  will need some followup with  dr castro    11-26-19     DM: Check sugar every morning before breakfast. Goal is 100-140. Record sugar levels on a log. Bring all sugar reports each visit. No insulin if sugar less 100. Check feet daily for cuts or sores.    11-26-19     DM RETINOPATHY EYE doctor at least one time a year. Has had eye work done at [facility] center   11-26-19     Cholesterol: keep a check on the cholesterol level to reduce risk for stroke or heart attack. Pravastin 40 mg every day at night along with a low cholesterol diet (no eggs no sausage no juárez).  11-26-19     Blood pressure is controlled with blood pressure medicine and dialysis But she can not come off her blood pressure medicine at this time. She is at risk for a heart attack or a stroke if blood pressure is not controlled   11-26-19     Dialysis: days are on Mon Wed Friday. Access in the left upper arm graft doing well   11-26-19     she has been evaluated at [facility] for a kidney transplant  5-8-19      cervical spinal stenosis. repair done by dr urrutia on 2-29-16. has done well  5-8-19      RIGHT KNEE REPLACEMENT. 30 oct 15 did well  5-8-19      CAD: SHE HAS A STENT IN A CORONARY ARTERY AND REVERSIBLE ISCHEMIA FROM DR MCMANUS AT [facility] 19 AUG 2015. has had re EVAL BY CARDIO and doing well     11-26-19      RIght axillary area: hidradenitis: cleared up, and the left lower leg abscess has healed up. No antiperspirants. and no shaving 7-20-12     [facility] for teeth:      Paps at gyne      Needs to get back to [facility] for diabetes eye check:. 6-11-12     Mammogram one time a year.      Return in three with meds and sugars   
Cephalic

## 2022-11-29 NOTE — OB PROVIDER H&P - HISTORY OF PRESENT ILLNESS
R2 H&P    Pt is a 38y/o  at 39+1 admitted for labor at term. Patient states that she is feeling contractions every couple of minutes. Denies LOF, VB. Endorses FM  Prenatal course c/b GDMA1  GBS pos  EFW 3000    OBHx:     6#15  GynHx: Denies cysts, fibroids, abnormal paps, STIs  PMHx: Denies  PSHx: Breast Augmentation  Med: Denies  All: NKDA  SH: denies toxic habits x3

## 2022-11-29 NOTE — DISCHARGE NOTE OB - CARE PROVIDER_API CALL
Perlita Gutierrez J  OBSTETRICS AND GYNECOLOGY  [address]  [place] [address]  Phone: [phone]  Fax: [phone]  Established Patient  Follow Up Time: Routine

## 2022-11-29 NOTE — OB RN DELIVERY SUMMARY - NS_SEPSISRSKCALC_OBGYN_ALL_OB_FT
EOS calculated successfully. EOS Risk Factor: 0.5/1000 live births ([facility] national incidence); GA=39w1d; Temp=99.1; ROM=2.533; GBS='Positive'; Antibiotics='GBS specific antibiotics > 2 hrs prior to birth'

## 2022-11-29 NOTE — DISCHARGE NOTE OB - PATIENT PORTAL LINK FT
You can access the FollowMyHealth Patient Portal offered by [facility] by registering at the following website: [email or web address]. By joining [employer]’s FollowMyHealth portal, you will also be able to view your health information using other applications (apps) compatible with our system.

## 2022-11-29 NOTE — OB PROVIDER H&P - ASSESSMENT
A&P: 37 year old  @39+1 presents to L and D complaining of cx every couple of minutes. No Patient to be admitted in labor at term  Labor: admit to L&D  -expct mgmt  -routine labs  -EFM/toco  -NPO, IV hydration  Fetal: cat 1 tracing, fetal status reassuring  GBS: pos, on amp  Analgesia: Epi      d/w Dr Matt Henderson PGY2

## 2022-11-30 VITALS
SYSTOLIC BLOOD PRESSURE: 115 MMHG | TEMPERATURE: 98 F | HEART RATE: 82 BPM | DIASTOLIC BLOOD PRESSURE: 74 MMHG | RESPIRATION RATE: 18 BRPM | OXYGEN SATURATION: 98 %

## 2022-11-30 RX ORDER — IBUPROFEN 200 MG
600 TABLET ORAL EVERY 6 HOURS
Refills: 0 | Status: DISCONTINUED | OUTPATIENT
Start: 2022-11-30 | End: 2022-11-30

## 2022-11-30 NOTE — PROGRESS NOTE ADULT - ASSESSMENT
A: 37y PPD#1 s/p  doing well.    Plan:  Routine postpartum care  Encouraged out of bed  Regular diet

## 2022-11-30 NOTE — CHART NOTE - NSCHARTNOTEFT_GEN_A_CORE
Patient seen for: nutrition consult for GDM postpartum education prior to discharge    Source: patient, EMR    Patient is a 36yo female PPD#1 from   Admission date:   Antepartum course significant for GDMA1 (patient denies history of GDM with prior pregnancy)  Pt plans on breastfeeding infant    Chart reviewed, events noted. Meds/labs reviewed.    Anthropometrics:  Height: 63 inches  Pre-pregnancy weight: 125 pounds   Weight gain: 20 pounds   Admit/Dosing wt: 145 pounds     Nutrition Diagnosis:   Food and Nutrition Related Knowledge Deficit related to knowledge of post-partum GDM nutrition recommendations as evidenced by first pregnancy complicated by GDM, now post-partum.   Goal: Pt able to teach back 2 points of nutrition education provided.     Nutrition Intervention:  Post-partum GDM diet education provided to patient and spouse at bedside:   1) Increased risk of developing T2DM with GDM and ways to help prevent development  2) 4-12 week fasting oral glucose tolerance test follow-up as outpatient  3) General healthful diet and physical activity recommendations discussed  4) Increased energy needs with breastfeeding    After-delivery GDM education handout provided. Pt with no nutrition-related questions at this time. Pt and spouse made aware RD remains available.      Monitoring:  No other nutrition risks were identified during this encounter.  RD remains available upon request and will follow up per protocol.    Stefanie Colmenares MS HAZEL CDN [facility] Pager #[phone]

## 2022-11-30 NOTE — PROGRESS NOTE ADULT - SUBJECTIVE AND OBJECTIVE BOX
R1 Progress Note    Patient seen and examined at bedside, no acute overnight events. No acute complaints, pain well controlled. Patient is ambulating, voiding spontaneously, passing gas, and tolerating regular diet. Denies CP, SOB, N/V, HA, blurred vision, epigastric pain.    Vital Signs Last 24 Hours  T(C): 37.1 (11-29-22 @ 19:50), Max: 37.3 (11-29-22 @ 16:40)  HR: 88 (11-29-22 @ 19:50) (78 - 146)  BP: 98/64 (11-29-22 @ 19:50) (98/64 - 135/79)  RR: 17 (11-29-22 @ 19:50) (17 - 18)  SpO2: 97% (11-29-22 @ 19:50) (89% - 100%)    Physical Exam:  General: NAD  Abdomen: Soft, non-tender, non-distended, fundus firm  Pelvic: Lochia wnl    Labs:    Blood Type: O Positive  Antibody Screen: Negative  RPR: Negative               11.1   11.56 )-----------( 275      ( 11-29 @ 12:42 )             35.4         MEDICATIONS  (STANDING):  acetaminophen     Tablet .. 975 milliGRAM(s) Oral <User Schedule>  diphtheria/tetanus/pertussis (acellular) Vaccine (Adacel) 0.5 milliLiter(s) IntraMuscular once  ibuprofen  Tablet. 600 milliGRAM(s) Oral every 6 hours  ketorolac   Injectable 30 milliGRAM(s) IV Push once  oxytocin Infusion 41.667 milliUNIT(s)/Min (125 mL/Hr) IV Continuous <Continuous>  oxytocin Infusion. 2 milliUNIT(s)/Min (2 mL/Hr) IV Continuous <Continuous>  prenatal multivitamin 1 Tablet(s) Oral daily  sodium chloride 0.9% lock flush 3 milliLiter(s) IV Push every 8 hours    MEDICATIONS  (PRN):  benzocaine 20%/menthol 0.5% Spray 1 Spray(s) Topical every 6 hours PRN for Perineal discomfort  dibucaine 1% Ointment 1 Application(s) Topical every 6 hours PRN Perineal discomfort  diphenhydrAMINE 25 milliGRAM(s) Oral every 6 hours PRN Pruritus  hydrocortisone 1% Cream 1 Application(s) Topical every 6 hours PRN Moderate Pain (4-6)  lanolin Ointment 1 Application(s) Topical every 6 hours PRN nipple soreness  magnesium hydroxide Suspension 30 milliLiter(s) Oral two times a day PRN Constipation  oxyCODONE    IR 5 milliGRAM(s) Oral every 3 hours PRN Moderate to Severe Pain (4-10)  oxyCODONE    IR 5 milliGRAM(s) Oral once PRN Moderate to Severe Pain (4-10)  pramoxine 1%/zinc 5% Cream 1 Application(s) Topical every 4 hours PRN Moderate Pain (4-6)  simethicone 80 milliGRAM(s) Chew every 4 hours PRN Gas  witch hazel Pads 1 Application(s) Topical every 4 hours PRN Perineal discomfort  
S: Patient doing well. No complaints. Minimal lochia. Pain controlled.  breast feeding  O: Vital Signs Last 24 Hrs  T(C): 36.9 (30 Nov 2022 06:01), Max: 37.3 (29 Nov 2022 16:40)  T(F): 98.4 (30 Nov 2022 06:01), Max: 99.1 (29 Nov 2022 16:40)  HR: 68 (30 Nov 2022 06:01) (68 - 146)  BP: 110/70 (30 Nov 2022 06:01) (98/64 - 135/76)  BP(mean): 75 (29 Nov 2022 19:50) (75 - 75)  RR: 18 (30 Nov 2022 06:01) (17 - 18)  SpO2: 97% (30 Nov 2022 06:01) (93% - 100%)    Parameters below as of 30 Nov 2022 06:01  Patient On (Oxygen Delivery Method): room air        Gen: NAD  Abd: soft, Nontender, Nondistended, fundus firm  Ext: no tendern, mild edema    Labs:                        11.1   11.56 )-----------( 275      ( 29 Nov 2022 12:42 )             35.4

## 2022-11-30 NOTE — PROGRESS NOTE ADULT - ASSESSMENT
36y/o  PPD#1 from  c/b GDMA1 in stable condition. Patient is progressing well postpartum.    #GDMA1  - f/u OGTT postpartum    #postpartum state  - Continue with po analgesia  - Increase ambulation  - Continue regular diet  - IV lock  - No labs    Nino Bauer  PGY-1

## 2023-11-07 NOTE — OB PROVIDER IHI INDUCTION/AUGMENTATION NOTE - NS_PELVIS_OBGYN_ALL_OB
Elvia Yoder, LISA 11/7/2023 7:20 AM CST      ----- Message -----  From: Juan Polk  Sent: 11/6/2023 6:56 PM CST  To: *  Subject: Test results     Hello, I’ve started taking the medicine and I am getting sick after taking. Nausea and stomach ache. Is there any way to reduce this while on the medication? I’ve tried the first pill empty stomach & tried eating for the second, much worse the second go round.     I don’t mind continuing the medicine but hope there is a way to get through it, or it gets easier as the days go by.      Adequate